# Patient Record
Sex: FEMALE | Race: ASIAN | NOT HISPANIC OR LATINO | Employment: FULL TIME | URBAN - METROPOLITAN AREA
[De-identification: names, ages, dates, MRNs, and addresses within clinical notes are randomized per-mention and may not be internally consistent; named-entity substitution may affect disease eponyms.]

---

## 2019-12-28 ENCOUNTER — HOSPITAL ENCOUNTER (EMERGENCY)
Facility: MEDICAL CENTER | Age: 34
End: 2019-12-28
Attending: EMERGENCY MEDICINE
Payer: COMMERCIAL

## 2019-12-28 ENCOUNTER — APPOINTMENT (OUTPATIENT)
Dept: RADIOLOGY | Facility: MEDICAL CENTER | Age: 34
End: 2019-12-28
Attending: EMERGENCY MEDICINE
Payer: COMMERCIAL

## 2019-12-28 VITALS
WEIGHT: 125.66 LBS | SYSTOLIC BLOOD PRESSURE: 119 MMHG | OXYGEN SATURATION: 96 % | HEIGHT: 64 IN | TEMPERATURE: 96.7 F | RESPIRATION RATE: 18 BRPM | BODY MASS INDEX: 21.45 KG/M2 | HEART RATE: 82 BPM | DIASTOLIC BLOOD PRESSURE: 74 MMHG

## 2019-12-28 DIAGNOSIS — S80.11XA CONTUSION OF RIGHT LOWER LEG, INITIAL ENCOUNTER: ICD-10-CM

## 2019-12-28 PROCEDURE — 73590 X-RAY EXAM OF LOWER LEG: CPT | Mod: RT

## 2019-12-28 PROCEDURE — 304217 HCHG IRRIGATION SYSTEM

## 2019-12-28 PROCEDURE — 99283 EMERGENCY DEPT VISIT LOW MDM: CPT

## 2019-12-28 SDOH — HEALTH STABILITY: MENTAL HEALTH: HOW OFTEN DO YOU HAVE A DRINK CONTAINING ALCOHOL?: NEVER

## 2019-12-29 NOTE — ED PROVIDER NOTES
ED Provider Note    CHIEF COMPLAINT  Chief Complaint   Patient presents with   • Leg Injury       HPI  Jelena Capone is a 34 y.o. female who presents with chief complaint of R leg pain. Patient was sledding and ran into a tree striking her right leg.  She was ambulatory afterwards.  Patient denies any head trauma loss of consciousness nausea or vomiting neck or back pain or orthopedic injury otherwise.    REVIEW OF SYSTEMS  ROS  See HPI for further details. All other systems are negative.     PAST MEDICAL HISTORY       SOCIAL HISTORY  Social History     Tobacco Use   • Smoking status: Never Smoker   • Smokeless tobacco: Never Used   Substance and Sexual Activity   • Alcohol use: Never     Frequency: Never   • Drug use: Never   • Sexual activity: Not on file       SURGICAL HISTORY  patient denies any surgical history    CURRENT MEDICATIONS  Home Medications    **Home medications have not yet been reviewed for this encounter**         ALLERGIES  No Known Allergies    PHYSICAL EXAM  Physical Exam   Constitutional: She is oriented to person, place, and time. She appears well-developed and well-nourished.   HENT:   Head: Normocephalic and atraumatic.   Eyes: Conjunctivae are normal.   Neck: Normal range of motion. Neck supple.   Pulmonary/Chest: Effort normal.   Musculoskeletal:      Comments: Right leg with overlying 12 cm abrasion without any associated laceration.  Tenderness palpation at the length of the tibia without any obvious underlying bony abnormality.  No pain at the knee, patella, medial or lateral joint line or fibular head.  No pain at the ankle, full range of motion of both the knee and the ankle without any pain evoked on range of motion.  No pain of the foot.   Neurological: She is alert and oriented to person, place, and time.   Skin: Skin is warm.   Psychiatric: She has a normal mood and affect. Her behavior is normal.         COURSE & MEDICAL DECISION MAKING  Pertinent Labs & Imaging studies reviewed.  (See chart for details)  Patient with abrasion of her right leg with associated contusion.  Will check x-ray for possible fracture is unlikely this ambulatory patient.  X-rays normal.  Patient tetanus updated.  Patient wound cleaned at bedside.  She is flying to [place] tomorrow I have asked her to walk around the airplane throughout the flight and take a baby aspirin before she flies.    The patient will return for worsening symptoms and is stable at the time of discharge. The patient verbalizes understanding and will comply.    FINAL IMPRESSION    1. Contusion of right lower leg, initial encounter               Electronically signed by: Cory Garcia, 12/28/2019 6:29 PM

## 2019-12-29 NOTE — ED TRIAGE NOTES
"C/O right lower leg pain after experiencing a GLF earlier today.   Chief Complaint   Patient presents with   • Leg Injury     /78   Pulse 89   Temp 36.8 °C (98.2 °F) (Temporal)   Resp 15   Ht 1.626 m (5' 4\")   Wt 57 kg (125 lb 10.6 oz)   LMP 12/21/2019 (Approximate)   SpO2 97%   BMI 21.57 kg/m²     "

## 2019-12-29 NOTE — DISCHARGE INSTRUCTIONS
Your x-ray is negative, would like you to walk around during her flight tomorrow to make sure that you decrease your risk of developing a blood clot, please also take a baby aspirin which you can get at [facility] before your flight.

## 2019-12-29 NOTE — ED NOTES
ERP at bedside. Pt agrees with plan of care discussed by ERP. AIDET acknowledged with patient. Lac irrigated. [place] in low position, side rail up for pt safety. Call light within reach. Will continue to monitor.

## 2019-12-29 NOTE — ED NOTES
Patient is stable for discharge at this time, anticipatory guidance provided, close follow-up is encouraged, and ED return instructions have been detailed. Patient and family is agreeable to the disposition and plan  and discharged home in ambulatory state and in good condition.